# Patient Record
Sex: FEMALE | Race: BLACK OR AFRICAN AMERICAN | NOT HISPANIC OR LATINO | Employment: PART TIME | URBAN - METROPOLITAN AREA
[De-identification: names, ages, dates, MRNs, and addresses within clinical notes are randomized per-mention and may not be internally consistent; named-entity substitution may affect disease eponyms.]

---

## 2021-07-14 LAB
HEPATITIS B SURFACE ANTIGEN (EXTERNAL): NEGATIVE
HIV1+2 AB SERPL QL IA: NEGATIVE
RUBELLA ANTIBODY IGG (EXTERNAL): NORMAL

## 2021-12-17 LAB — GROUP B STREPTOCOCCUS (EXTERNAL): NEGATIVE

## 2022-01-16 ENCOUNTER — HOSPITAL ENCOUNTER (OUTPATIENT)
Facility: CLINIC | Age: 32
Discharge: HOME | End: 2022-01-16
Attending: OBSTETRICS & GYNECOLOGY | Admitting: OBSTETRICS & GYNECOLOGY
Payer: COMMERCIAL

## 2022-01-16 VITALS — TEMPERATURE: 98.6 F | HEIGHT: 62 IN | WEIGHT: 185 LBS | RESPIRATION RATE: 16 BRPM | BODY MASS INDEX: 34.04 KG/M2

## 2022-01-16 PROBLEM — Z36.89 ENCOUNTER FOR TRIAGE IN PREGNANT PATIENT: Status: ACTIVE | Noted: 2022-01-16

## 2022-01-16 LAB
ALBUMIN UR-MCNC: 100 MG/DL
APPEARANCE UR: ABNORMAL
BACTERIA #/AREA URNS HPF: ABNORMAL /HPF
BASOPHILS # BLD AUTO: 0 10E3/UL (ref 0–0.2)
BASOPHILS NFR BLD AUTO: 1 %
BILIRUB UR QL STRIP: NEGATIVE
COLOR UR AUTO: YELLOW
EOSINOPHIL # BLD AUTO: 0 10E3/UL (ref 0–0.7)
EOSINOPHIL NFR BLD AUTO: 1 %
ERYTHROCYTE [DISTWIDTH] IN BLOOD BY AUTOMATED COUNT: 15.4 % (ref 10–15)
GLUCOSE UR STRIP-MCNC: NEGATIVE MG/DL
HCT VFR BLD AUTO: 34.1 % (ref 35–47)
HGB BLD-MCNC: 10.9 G/DL (ref 11.7–15.7)
HGB UR QL STRIP: NEGATIVE
HOLD SPECIMEN: NORMAL
IMM GRANULOCYTES # BLD: 0 10E3/UL
IMM GRANULOCYTES NFR BLD: 1 %
KETONES UR STRIP-MCNC: >150 MG/DL
LEUKOCYTE ESTERASE UR QL STRIP: ABNORMAL
LYMPHOCYTES # BLD AUTO: 0.4 10E3/UL (ref 0.8–5.3)
LYMPHOCYTES NFR BLD AUTO: 6 %
MCH RBC QN AUTO: 27.7 PG (ref 26.5–33)
MCHC RBC AUTO-ENTMCNC: 32 G/DL (ref 31.5–36.5)
MCV RBC AUTO: 87 FL (ref 78–100)
MONOCYTES # BLD AUTO: 0.5 10E3/UL (ref 0–1.3)
MONOCYTES NFR BLD AUTO: 7 %
MUCOUS THREADS #/AREA URNS LPF: PRESENT /LPF
NEUTROPHILS # BLD AUTO: 5.4 10E3/UL (ref 1.6–8.3)
NEUTROPHILS NFR BLD AUTO: 84 %
NITRATE UR QL: NEGATIVE
NRBC # BLD AUTO: 0 10E3/UL
NRBC BLD AUTO-RTO: 0 /100
PH UR STRIP: 6 [PH] (ref 5–7)
PLATELET # BLD AUTO: 73 10E3/UL (ref 150–450)
RBC # BLD AUTO: 3.93 10E6/UL (ref 3.8–5.2)
RBC URINE: 1 /HPF
SP GR UR STRIP: 1.03 (ref 1–1.03)
SQUAMOUS EPITHELIAL: 2 /HPF
UROBILINOGEN UR STRIP-MCNC: NORMAL MG/DL
WBC # BLD AUTO: 6.3 10E3/UL (ref 4–11)
WBC URINE: 4 /HPF

## 2022-01-16 PROCEDURE — 250N000013 HC RX MED GY IP 250 OP 250 PS 637

## 2022-01-16 PROCEDURE — 250N000013 HC RX MED GY IP 250 OP 250 PS 637: Performed by: OBSTETRICS & GYNECOLOGY

## 2022-01-16 PROCEDURE — G0463 HOSPITAL OUTPT CLINIC VISIT: HCPCS

## 2022-01-16 PROCEDURE — 81001 URINALYSIS AUTO W/SCOPE: CPT | Performed by: OBSTETRICS & GYNECOLOGY

## 2022-01-16 PROCEDURE — 85025 COMPLETE CBC W/AUTO DIFF WBC: CPT | Performed by: OBSTETRICS & GYNECOLOGY

## 2022-01-16 RX ORDER — ACETAMINOPHEN 325 MG/1
TABLET ORAL
Status: COMPLETED
Start: 2022-01-16 | End: 2022-01-16

## 2022-01-16 RX ORDER — ACETAMINOPHEN 325 MG/1
975 TABLET ORAL ONCE
Status: COMPLETED | OUTPATIENT
Start: 2022-01-16 | End: 2022-01-16

## 2022-01-16 RX ORDER — DIPHENHYDRAMINE HCL 25 MG
50 CAPSULE ORAL EVERY 6 HOURS PRN
Status: COMPLETED | OUTPATIENT
Start: 2022-01-16 | End: 2022-01-16

## 2022-01-16 RX ORDER — DIPHENHYDRAMINE HYDROCHLORIDE 50 MG/ML
25 INJECTION INTRAMUSCULAR; INTRAVENOUS EVERY 6 HOURS PRN
Status: COMPLETED | OUTPATIENT
Start: 2022-01-16 | End: 2022-01-16

## 2022-01-16 RX ORDER — LIDOCAINE 40 MG/G
CREAM TOPICAL
Status: DISCONTINUED | OUTPATIENT
Start: 2022-01-16 | End: 2022-01-16 | Stop reason: HOSPADM

## 2022-01-16 RX ADMIN — ACETAMINOPHEN 975 MG: 325 TABLET ORAL at 18:58

## 2022-01-16 RX ADMIN — DIPHENHYDRAMINE HYDROCHLORIDE 50 MG: 25 CAPSULE ORAL at 20:20

## 2022-01-16 RX ADMIN — ACETAMINOPHEN 975 MG: 325 TABLET, FILM COATED ORAL at 18:58

## 2022-01-16 ASSESSMENT — MIFFLIN-ST. JEOR: SCORE: 1492.4

## 2022-01-17 NOTE — PROVIDER NOTIFICATION
22 1845   Provider Notification   Provider Name/Title Dr. Ritter   Method of Notification Phone   Request Evaluate - Remote   Notification Reason Patient Arrived     MD updated on patient arrival. She is a  at 39 3/7ths.  Her pregnancy has been uncomplicated.  FHT: 155, moderate variability with accels and variable decels.  She is jv about every 10.5 minutes but is not really feeling them. She came in for severe back pain that started last night and has gotten more intense throughout the day.  She has tried a warm bath, but has not gotten any relief.  The pain is constant and does not come and go.  She was unable to give us a urine sample on arrival.  Orders taken for 50 mg of benadryl and 975 mg of tylenol and a to send a urine sample when the patient is able to give one.  Will update MD as necessary.

## 2022-01-17 NOTE — DISCHARGE INSTRUCTIONS
Discharge Instruction for Undelivered Patients      You were seen for: Pain  We Consulted: Dr. Ritter   You had (Test or Medicine): Vaginal exam, external fetal monitoring, tylenol, benadryl, cbc, UA      Diet:   Drink 8 to 12 glasses of liquids (milk, juice, water) every day.  You may eat meals and snacks.     Activity:  Count fetal kicks everyday (see handout)  Call your doctor or nurse midwife if your baby is moving less than usual.     Call your provider if you notice:  Swelling in your face or increased swelling in your hands or legs.  Headaches that are not relieved by Tylenol (acetaminophen).  Changes in your vision (blurring: seeing spots or stars.)  Nausea (sick to your stomach) and vomiting (throwing up).   Weight gain of 5 pounds or more per week.  Heartburn that doesn't go away.  Signs of bladder infection: pain when you urinate (use the toilet), need to go more often and more urgently.  The bag of huber (rupture of membranes) breaks, or you notice leaking in your underwear.  Bright red blood in your underwear.  Abdominal (lower belly) or stomach pain.  For first baby: Contractions (tightening) less than 5 minutes apart for one hour or more.  Second (plus) baby: Contractions (tightening) less than 10 minutes apart and getting stronger.  *If less than 34 weeks: Contractions (tightening) more than 6 times in one hour.  Increase or change in vaginal discharge (note the color and amount)    Follow-up:  As scheduled in the clinic or sooner

## 2022-01-17 NOTE — PLAN OF CARE
Data: Patient presented to [facility]: 2022  6:14 PM.  Reason for maternal/fetal assessment is back pain. Patient reports severe back pain that started last night and has gotten progressively more intense.  She also says that the pain is constant but does not feel like contractions.  Patient is a .  Prenatal record reviewed. Pregnancy has been uncomplicated..  Gestational Age 39w3d. VSS. Fetal movement active. Patient denies uterine contractions, leaking of vaginal fluid/rupture of membranes, vaginal bleeding, abdominal pain, pelvic pressure, nausea, vomiting, headache, visual disturbances, epigastric or URQ pain, significant edema. Support person is present.   Action: Verbal consent for EFM. Triage assessment completed. Bill of rights reviewed.  Response: Patient verbalized agreement with plan. Will contact Dr Ami Ritter with update and for further orders.

## 2022-01-17 NOTE — PLAN OF CARE
Data: Patient assessed in the [facility] for back and pelvic pain. Cervical exam: see flow sheet for details. Membranes intact. Contractions/uterine assessment; see flow sheet for details.  Action:  Presumed adequate fetal oxygenation documented (see flow record). Discharge instructions reviewed.  Patient instructed to report change in fetal movement, vaginal leaking of fluid or bleeding, abdominal pain, or any concerns related to the pregnancy to her nurse/physician.    Response: Orders to discharge home per Dr. Ritter.  Patient verbalized understanding of education and verbalized agreement with plan. Discharged to home at 2140. Maricruz Bueno RN on 1/16/2022 at 9:56 PM

## 2022-01-17 NOTE — PROVIDER NOTIFICATION
01/16/22 2112   Provider Notification   Provider Name/Title Dr. Ritter    Method of Notification Phone   Request Evaluate - Remote   Notification Reason Status Update     Lab results reviewed.     Platelet Count   Date Value Ref Range Status   01/16/2022 73 (L) 150 - 450 10e3/uL Final     UA RESULTS:  Recent Labs   Lab Test 01/16/22 2023   COLOR Yellow   APPEARANCE Slightly Cloudy*   URINEGLC Negative   URINEBILI Negative   URINEKETONE >150*   SG 1.035   UBLD Negative   URINEPH 6.0   PROTEIN 100 *   NITRITE Negative   LEUKEST Trace*   RBCU 1   WBCU 4     Orders received to discharge home and follow up in clinic for management of plt's. Telephone orders read back and verified. Will inform pt of plan of care. Maricruz Bueno RN on 1/16/2022 at 9:53 PM

## 2022-01-17 NOTE — PROVIDER NOTIFICATION
"   01/16/22 2038   Provider Notification   Provider Name/Title Dr. Ritter    Method of Notification Phone   Request Evaluate - Remote   Notification Reason Status Update     Tylenol and benadryl administered. Pain improving. Pt states the majority of her pain is suprapubic whenever she turns from left to right. UA pending. Pt has a history of thrombocytopenia. She is concerned her plts are very low. CBC-d pending. Pt states she is very uncomfortable and \"can't\" be pregnant any longer. She would like to know how to go about being induced. Induction process discussed in length. She has an appointment with her physician on Wednesday. If UA is negative, orders received to discharge home. Telephone orders read back and verified. Will inform pt of plan of care. Maricruz Bueno RN on 1/16/2022 at 9:03 PM  "

## 2022-01-19 ENCOUNTER — HOSPITAL ENCOUNTER (INPATIENT)
Facility: CLINIC | Age: 32
LOS: 2 days | Discharge: HOME | End: 2022-01-21
Attending: OBSTETRICS & GYNECOLOGY | Admitting: OBSTETRICS & GYNECOLOGY
Payer: COMMERCIAL

## 2022-01-19 DIAGNOSIS — Z11.52 ENCOUNTER FOR PREPROCEDURE SCREENING LABORATORY TESTING FOR COVID-19: Primary | ICD-10-CM

## 2022-01-19 DIAGNOSIS — Z01.812 ENCOUNTER FOR PREPROCEDURE SCREENING LABORATORY TESTING FOR COVID-19: Primary | ICD-10-CM

## 2022-01-19 LAB — SARS-COV-2 RNA RESP QL NAA+PROBE: POSITIVE

## 2022-01-19 PROCEDURE — 87635 SARS-COV-2 COVID-19 AMP PRB: CPT | Performed by: OBSTETRICS & GYNECOLOGY

## 2022-01-19 PROCEDURE — 120N000001 HC R&B MED SURG/OB

## 2022-01-19 PROCEDURE — 250N000013 HC RX MED GY IP 250 OP 250 PS 637: Performed by: OBSTETRICS & GYNECOLOGY

## 2022-01-19 PROCEDURE — 722N000001 HC LABOR CARE VAGINAL DELIVERY SINGLE

## 2022-01-19 PROCEDURE — 250N000011 HC RX IP 250 OP 636: Performed by: OBSTETRICS & GYNECOLOGY

## 2022-01-19 RX ORDER — OXYTOCIN 10 [USP'U]/ML
10 INJECTION, SOLUTION INTRAMUSCULAR; INTRAVENOUS
Status: COMPLETED | OUTPATIENT
Start: 2022-01-19 | End: 2022-01-19

## 2022-01-19 RX ORDER — MISOPROSTOL 200 UG/1
400 TABLET ORAL
Status: DISCONTINUED | OUTPATIENT
Start: 2022-01-19 | End: 2022-01-21 | Stop reason: HOSPADM

## 2022-01-19 RX ORDER — CARBOPROST TROMETHAMINE 250 UG/ML
250 INJECTION, SOLUTION INTRAMUSCULAR
Status: DISCONTINUED | OUTPATIENT
Start: 2022-01-19 | End: 2022-01-21 | Stop reason: HOSPADM

## 2022-01-19 RX ORDER — MODIFIED LANOLIN
OINTMENT (GRAM) TOPICAL
Status: DISCONTINUED | OUTPATIENT
Start: 2022-01-19 | End: 2022-01-21 | Stop reason: HOSPADM

## 2022-01-19 RX ORDER — HYDROCORTISONE 2.5 %
CREAM (GRAM) TOPICAL 3 TIMES DAILY PRN
Status: DISCONTINUED | OUTPATIENT
Start: 2022-01-19 | End: 2022-01-21 | Stop reason: HOSPADM

## 2022-01-19 RX ORDER — OXYCODONE HYDROCHLORIDE 5 MG/1
5 TABLET ORAL EVERY 4 HOURS PRN
Status: DISCONTINUED | OUTPATIENT
Start: 2022-01-19 | End: 2022-01-21 | Stop reason: HOSPADM

## 2022-01-19 RX ORDER — NALOXONE HYDROCHLORIDE 0.4 MG/ML
0.2 INJECTION, SOLUTION INTRAMUSCULAR; INTRAVENOUS; SUBCUTANEOUS
Status: DISCONTINUED | OUTPATIENT
Start: 2022-01-19 | End: 2022-01-21 | Stop reason: HOSPADM

## 2022-01-19 RX ORDER — DOCUSATE SODIUM 100 MG/1
100 CAPSULE, LIQUID FILLED ORAL 2 TIMES DAILY
Status: DISCONTINUED | OUTPATIENT
Start: 2022-01-19 | End: 2022-01-21 | Stop reason: HOSPADM

## 2022-01-19 RX ORDER — ACETAMINOPHEN 325 MG/1
650 TABLET ORAL EVERY 4 HOURS PRN
Status: DISCONTINUED | OUTPATIENT
Start: 2022-01-19 | End: 2022-01-21 | Stop reason: HOSPADM

## 2022-01-19 RX ORDER — HYDROXYZINE HYDROCHLORIDE 25 MG/1
25-50 TABLET, FILM COATED ORAL
Status: DISCONTINUED | OUTPATIENT
Start: 2022-01-19 | End: 2022-01-21 | Stop reason: HOSPADM

## 2022-01-19 RX ORDER — NALOXONE HYDROCHLORIDE 0.4 MG/ML
0.4 INJECTION, SOLUTION INTRAMUSCULAR; INTRAVENOUS; SUBCUTANEOUS
Status: DISCONTINUED | OUTPATIENT
Start: 2022-01-19 | End: 2022-01-21 | Stop reason: HOSPADM

## 2022-01-19 RX ORDER — METHYLERGONOVINE MALEATE 0.2 MG/ML
200 INJECTION INTRAVENOUS
Status: DISCONTINUED | OUTPATIENT
Start: 2022-01-19 | End: 2022-01-21 | Stop reason: HOSPADM

## 2022-01-19 RX ORDER — TRANEXAMIC ACID 10 MG/ML
1 INJECTION, SOLUTION INTRAVENOUS EVERY 30 MIN PRN
Status: DISCONTINUED | OUTPATIENT
Start: 2022-01-19 | End: 2022-01-21 | Stop reason: HOSPADM

## 2022-01-19 RX ORDER — MISOPROSTOL 200 UG/1
800 TABLET ORAL
Status: DISCONTINUED | OUTPATIENT
Start: 2022-01-19 | End: 2022-01-21 | Stop reason: HOSPADM

## 2022-01-19 RX ORDER — IBUPROFEN 800 MG/1
800 TABLET, FILM COATED ORAL EVERY 6 HOURS PRN
Status: DISCONTINUED | OUTPATIENT
Start: 2022-01-19 | End: 2022-01-21 | Stop reason: HOSPADM

## 2022-01-19 RX ORDER — OXYTOCIN/0.9 % SODIUM CHLORIDE 30/500 ML
340 PLASTIC BAG, INJECTION (ML) INTRAVENOUS CONTINUOUS PRN
Status: DISCONTINUED | OUTPATIENT
Start: 2022-01-19 | End: 2022-01-21 | Stop reason: HOSPADM

## 2022-01-19 RX ADMIN — ACETAMINOPHEN 650 MG: 325 TABLET, FILM COATED ORAL at 18:26

## 2022-01-19 RX ADMIN — ACETAMINOPHEN 650 MG: 325 TABLET, FILM COATED ORAL at 22:42

## 2022-01-19 RX ADMIN — OXYTOCIN 10 UNITS: 10 INJECTION, SOLUTION INTRAMUSCULAR; INTRAVENOUS at 17:11

## 2022-01-19 RX ADMIN — IBUPROFEN 800 MG: 800 TABLET, FILM COATED ORAL at 18:26

## 2022-01-19 ASSESSMENT — ACTIVITIES OF DAILY LIVING (ADL)
ADLS_ACUITY_SCORE: 3
DIFFICULTY_EATING/SWALLOWING: NO
CONCENTRATING,_REMEMBERING_OR_MAKING_DECISIONS_DIFFICULTY: NO
WEAR_GLASSES_OR_BLIND: NO
ADLS_ACUITY_SCORE: 12
ADLS_ACUITY_SCORE: 12
WALKING_OR_CLIMBING_STAIRS_DIFFICULTY: NO
ADLS_ACUITY_SCORE: 3
DIFFICULTY_COMMUNICATING: NO
DRESSING/BATHING_DIFFICULTY: NO
PATIENT_/_FAMILY_COMMUNICATION_STYLE: SPOKEN LANGUAGE (ENGLISH OR BILINGUAL)
DOING_ERRANDS_INDEPENDENTLY_DIFFICULTY: NO
ADLS_ACUITY_SCORE: 3
HEARING_DIFFICULTY_OR_DEAF: NO
ADLS_ACUITY_SCORE: 3

## 2022-01-19 ASSESSMENT — MIFFLIN-ST. JEOR: SCORE: 1492.4

## 2022-01-19 NOTE — PROGRESS NOTES
"  2022    Stephanie Nicole  [id number]      OB Admit History & Physical      She has noticed uterine contractions since this morning that have been getting more frequent and more intense. She decided to come in until 5 pm while she was already experiencing intense pain with her contractions happening every 3 min since 3 pm. States she was picked up by her sister and she was trying to control her contractions\". She ended up having a precipitous delivery.     I was called at 1704 hrs, I was here at 1725 hrs. In house OB delivered her placenta.     Upon encounter, pt states she feels exhausted but overall in good spirits.       No LMP recorded. Patient is pregnant.   Her Estimated Date of Delivery: 2022, making her 39w6d.      Estimated body mass index is 33.84 kg/m  as calculated from the following:    Height as of 22: 1.575 m (5' 2\").    Weight as of 22: 83.9 kg (185 lb).  Her prenatal course has been  complicated by   - Thrombocytopenia - will need follow up with hematology   - was on 40 mg Dexamethasone (day 2)  - SHEILA/Pica   - declining iron PO    See prenatal for labs.  neg GBS, Rubella Immune, RH Positive       She is a 32 year old   Her OB history:   OB History    Para Term  AB Living   5 3 3 0 1 3   SAB IAB Ectopic Multiple Live Births   1 0 0 0 3      # Outcome Date GA Lbr Facundo/2nd Weight Sex Delivery Anes PTL Lv   5 Current            4 SAB 2020     SAB      3 Term 19    M    NASH   2 Term 16    F    NASH   1 Term 05/10/14    M    NASH          No past medical history on file.     No past surgical history on file.      No current outpatient medications on file.       Allergies: Patient has no known allergies.      REVIEW OF SYSTEMS:  NEUROLOGIC:  Negative  EYES:  Negative  ENT:  Negative  GI:  Negative  :  Negative  GYN:  Negative  CV:  Negative  PULMONARY:  Negative  MUSCULOSKELETAL:  Negative  PSYCH:  Negative      Social History "     Socioeconomic History     Marital status:      Spouse name: Not on file     Number of children: Not on file     Years of education: Not on file     Highest education level: Not on file   Occupational History     Not on file   Tobacco Use     Smoking status: Never Smoker     Smokeless tobacco: Never Used   Substance and Sexual Activity     Alcohol use: Not Currently     Drug use: Never     Sexual activity: Not on file   Other Topics Concern     Not on file   Social History Narrative     Not on file     Social Determinants of Health     Financial Resource Strain: Not on file   Food Insecurity: Not on file   Transportation Needs: Not on file   Physical Activity: Not on file   Stress: Not on file   Social Connections: Not on file   Intimate Partner Violence: Not on file   Housing Stability: Not on file      No family history on file.      Exam:    Vitals:   /62       Alert Awake in NAD  HEENT grossly normal  Neck: no lymphadenopathy or thryoidomegaly  Lungs CTAB  Back No CVAT  Heart RR  ABD gravid, NABS, soft, NT on exam with NT fundus palpable  Cervix NA  EXT:  no edema, no calf tenderness    For delivery note. Please see Dr. Davis's note and RN note.       Assessment/Plan: Stephanie Nicole is a 32 year old  at 39w6d GA by LMP here s/p precipitous delivery.     ANTEPARTUM  Prenatal Care:  - OB labs reviewed: A positive, Rubella immune, Heb B immune  - Genetics: declines  - Anatomy ultrasound: isolated EIF, otherwise normal anatomy.  Declines further workup.  - Rh positive, Rhogam not indicated  - GCT normal  - Declines flu, s/p Tdap   - COVID vaccine declines  - GBS negative  - Contraception: IUD, unsure which one, info given   - Feed: Breast   - Continue PNV     Others  Thrombocytopenia  - ok to discontinue dexamethasone  - will need follow up with hematology (PN)    SHEILA  - encourage to resume PO iron    S/p preciptious delivery  - continue routine postpartum care      Ami Ritter MD  Dept of  OB/GYN  January 19, 2022

## 2022-01-19 NOTE — PROVIDER NOTIFICATION
"   01/19/22 1704   Provider Notification   Provider Name/Title Dr. Ritter   Method of Notification Phone   Request Attend Delivery;Evaluate in Person   Notification Reason Patient Arrived;SVE  (pt complete and ready to push, multip)      Updated of pt arrival and complete stating \"I need to push.\"  Stated \"I will be there in 20 mins.\"  "

## 2022-01-19 NOTE — PROGRESS NOTES
Called emergently to L&D as the in house MD    Patient arrived complete and pushing and was delivered by RN 3 mins after arrival on L&D.  Attending MD was notified and on way to hospital.    Baby in Mother's arms and stable upon my entry into room.    Placenta delivered with mild traction, intact with 3 vessel cord.  Perineum inspected and intact.  No lacerations.  Minimal bleeding.  10 units pitocin given IM.    Dr. Ritter arrived shortly thereafter and assumed cares    Antonio Davis MD

## 2022-01-20 LAB
ERYTHROCYTE [DISTWIDTH] IN BLOOD BY AUTOMATED COUNT: 15.3 % (ref 10–15)
HCT VFR BLD AUTO: 33.3 % (ref 35–47)
HGB BLD-MCNC: 10.3 G/DL (ref 11.7–15.7)
MCH RBC QN AUTO: 27.3 PG (ref 26.5–33)
MCHC RBC AUTO-ENTMCNC: 30.9 G/DL (ref 31.5–36.5)
MCV RBC AUTO: 88 FL (ref 78–100)
PLAT MORPH BLD: NORMAL
PLATELET # BLD AUTO: 85 10E3/UL (ref 150–450)
RBC # BLD AUTO: 3.77 10E6/UL (ref 3.8–5.2)
RBC MORPH BLD: NORMAL
WBC # BLD AUTO: 7.6 10E3/UL (ref 4–11)

## 2022-01-20 PROCEDURE — 250N000011 HC RX IP 250 OP 636: Performed by: OBSTETRICS & GYNECOLOGY

## 2022-01-20 PROCEDURE — 36415 COLL VENOUS BLD VENIPUNCTURE: CPT | Performed by: OBSTETRICS & GYNECOLOGY

## 2022-01-20 PROCEDURE — 85027 COMPLETE CBC AUTOMATED: CPT | Performed by: OBSTETRICS & GYNECOLOGY

## 2022-01-20 PROCEDURE — 120N000001 HC R&B MED SURG/OB

## 2022-01-20 PROCEDURE — 250N000013 HC RX MED GY IP 250 OP 250 PS 637: Performed by: OBSTETRICS & GYNECOLOGY

## 2022-01-20 RX ORDER — IBUPROFEN 800 MG/1
800 TABLET, FILM COATED ORAL EVERY 6 HOURS PRN
Qty: 60 TABLET | Refills: 1 | Status: SHIPPED | OUTPATIENT
Start: 2022-01-20

## 2022-01-20 RX ORDER — DEXAMETHASONE 4 MG/1
40 TABLET ORAL DAILY
Status: COMPLETED | OUTPATIENT
Start: 2022-01-20 | End: 2022-01-21

## 2022-01-20 RX ORDER — DOCUSATE SODIUM 100 MG/1
100 CAPSULE, LIQUID FILLED ORAL 2 TIMES DAILY PRN
Qty: 10 CAPSULE | Refills: 1 | Status: SHIPPED | OUTPATIENT
Start: 2022-01-20

## 2022-01-20 RX ORDER — DIPHENHYDRAMINE HCL 25 MG
25-50 CAPSULE ORAL EVERY 6 HOURS PRN
Status: DISCONTINUED | OUTPATIENT
Start: 2022-01-20 | End: 2022-01-21 | Stop reason: HOSPADM

## 2022-01-20 RX ADMIN — ACETAMINOPHEN 650 MG: 325 TABLET, FILM COATED ORAL at 04:10

## 2022-01-20 RX ADMIN — ACETAMINOPHEN 650 MG: 325 TABLET, FILM COATED ORAL at 13:59

## 2022-01-20 RX ADMIN — IBUPROFEN 800 MG: 800 TABLET, FILM COATED ORAL at 00:44

## 2022-01-20 RX ADMIN — ACETAMINOPHEN 650 MG: 325 TABLET, FILM COATED ORAL at 18:17

## 2022-01-20 RX ADMIN — IBUPROFEN 800 MG: 800 TABLET, FILM COATED ORAL at 06:42

## 2022-01-20 RX ADMIN — IBUPROFEN 800 MG: 800 TABLET, FILM COATED ORAL at 13:59

## 2022-01-20 RX ADMIN — IBUPROFEN 800 MG: 800 TABLET, FILM COATED ORAL at 21:34

## 2022-01-20 RX ADMIN — ACETAMINOPHEN 650 MG: 325 TABLET, FILM COATED ORAL at 22:42

## 2022-01-20 RX ADMIN — DIPHENHYDRAMINE HYDROCHLORIDE 25 MG: 25 CAPSULE ORAL at 21:34

## 2022-01-20 RX ADMIN — ACETAMINOPHEN 650 MG: 325 TABLET, FILM COATED ORAL at 08:28

## 2022-01-20 RX ADMIN — DEXAMETHASONE 40 MG: 4 TABLET ORAL at 09:40

## 2022-01-20 RX ADMIN — DIPHENHYDRAMINE HYDROCHLORIDE 25 MG: 25 CAPSULE ORAL at 09:48

## 2022-01-20 ASSESSMENT — ACTIVITIES OF DAILY LIVING (ADL)
ADLS_ACUITY_SCORE: 3

## 2022-01-20 NOTE — PLAN OF CARE
Data: Stephanie Nicole transferred to 454 via wheelchair at 0010. Baby transferred via parent's arms.  Action: Receiving unit notified of transfer: Yes. Patient and family notified of room change. Report given to MARGAUX Acosta at 0022. Belongings sent to receiving unit. Accompanied by Registered Nurse. Oriented patient to surroundings. Call light within reach. ID bands double-checked with receiving RN.  Response: Patient tolerated transfer and is stable.

## 2022-01-20 NOTE — PROVIDER NOTIFICATION
Notified Dr. Cardenas of patient's request for prn Benadryl, and seeking clarification regarding the steroid she was taking prior to delivery. Dr. Cardenas placed orders - see MAR. Will update patient's primary RN.

## 2022-01-20 NOTE — LACTATION NOTE
"This note was copied from a baby's chart.  Lactation visit. This is Stephanie's fourth child, she had recently finished breastfeeding when writer arrived. Stephanie has elected to supplement with formula post nursing per her preference - this is her plan at discharge as well. She states she nursed her previous children \"a little bit\" and hopes to breastfeed longer this time. Writer reviewed importance of placing infant to breast first to bring in milk supply, followed by supplementation. Reviewed amounts to offer, pace feeding of formula to protect breastfeeding. Stephanie is aware she may call prn for lactation assistance.   "

## 2022-01-20 NOTE — DISCHARGE SUMMARY
[facility] Discharge Summary    Stephanie Nicole MRN# [id number]   Age: 32 year old YOB: 1990     Date of Admission:  2022  Date of Discharge::  2022   Admitting Physician:  Ami Ritter MD  Discharge Physician:  Dr. Claudine Ritter            Admission Diagnoses:     IUP at 39w6d    Active labor, complete dilation upon presentation    SHEILA    H/o Thrombocytopenia, suspect ITP this pregnancy          Discharge Diagnosis:       IUP at 39w6d, now delivered    COVID positive status           Procedures:     Procedure(s):             Medications Prior to Admission:     No medications prior to admission.             Discharge Medications:        Review of your medicines      START taking      Dose / Directions   acetaminophen 325 MG tablet  Commonly known as: TYLENOL      Dose: 650 mg  Take 2 tablets (650 mg) by mouth every 4 hours as needed for mild pain or fever  Quantity: 60 tablet  Refills: 0     docusate sodium 100 MG capsule  Commonly known as: COLACE      Dose: 100 mg  Take 1 capsule (100 mg) by mouth 2 times daily as needed for constipation  Quantity: 10 capsule  Refills: 1     ibuprofen 800 MG tablet  Commonly known as: ADVIL/MOTRIN      Dose: 800 mg  Take 1 tablet (800 mg) by mouth every 6 hours as needed for other (cramping)  Quantity: 60 tablet  Refills: 1           Where to get your medicines      These medications were sent to [facility] [address] [address]    Phone: [phone]     acetaminophen 325 MG tablet    docusate sodium 100 MG capsule    ibuprofen 800 MG tablet                Consultations:   None          Brief Admission History and Intrapartum Course:   She has noticed uterine contractions since this morning that have been getting more frequent and more intense. She decided to come in until 5 pm while she was already experiencing intense pain with her contractions happening  "every 3 min since 3 pm. States she was picked up by her sister and she was trying to control her contractions\". She ended up having a precipitous delivery with in-house services.     Delivery Summary    Stephanie Nicole MRN# [id number]   Age: 32 year old YOB: 1990          Bonnie, Female-Stephanie [[id number]]    Labor Event Times    Labor onset date: 22 Onset time:  1:30 PM   Dilation complete date: 22 Complete time:  5:03 PM   Start pushing date/time: 2022 1703      Labor Length    1st Stage (hrs): 3 (min): 33   2nd Stage (hrs): 0 (min): 1   3rd Stage (hrs): 0 (min): 6      Labor Events     labor?: No   steroids: None  Labor Type: Spontaneous  Predominate monitoring during 1st stage: continuous electronic fetal monitoring     Antibiotics received during labor?: No     Rupture date/time: 22   Rupture type: Spontaneous rupture of membranes occuring during spontaneous labor or augmentation  Fluid color: Clear  Fluid odor: Normal     1:1 continuous labor support provided by?: RN       Delivery/Placenta Date and Time    Delivery Date: 22 Delivery Time:  5:04 PM   Placenta Date/Time: 2022  5:10 PM  Oxytocin given at the time of delivery: after delivery of baby          Vaginal Counts          Needles Suture Needles Sponges (RETIRED) Instruments   Initial counts       Added to count       Relief counts       Final counts             Placed during labor Accounted for at the end of labor   FSE No NA   IUPC No NA   Cervadil No NA                     Apgars    Living status: Living   1 Minute 5 Minute 10 Minute 15 Minute 20 Minute   Skin color: 0  1       Heart rate: 2  2       Reflex irritability: 2  2       Muscle tone: 2  2       Respiratory effort: 2  2       Total: 8  9       Apgars assigned by: ARNAV WERNER RN     Cord    Vessels: 3 Vessels    Cord Complications: Nuchal   Nuchal Intervention: delivered through         Nuchal cord description: loose nuchal cord " "        Cord Blood Disposition: Lab    Gases Sent?: No    Delayed cord clamping?: Yes    Cord Clamping Delay (seconds):  seconds    Stem cell collection?: No        Resuscitation    Methods: None     [place] Measurements    Weight: 6 lb 9.8 oz Length: 1' 8.25\"   Head circumference: 13 cm       Skin to Skin and Feeding Plan    Skin to skin initiation date/time: 1841    Skin to skin with: Mother  Skin to skin end date/time:     Breastfeeding initiated date/time: 2022 1740     Labor Events and Shoulder Dystocia    Fetal Tracing Prior to Delivery: Category 1  Shoulder dystocia present?: Neg     Delivery (Maternal) (Provider to Complete) (090374)    Episiotomy: None  Perineal lacerations: None    Repair suture: None     Blood Loss  Mother: Stephanie Nicole #[id number]   Start of Mother's Information    Delivery Blood Loss  22 1330 - 22 1704    Postpartum QBL (mL) Hospital Encounter 227 mL    Total  227 mL         End of Mother's Information  Mother: Stephanie Nicole #[id number]          Delivery - Provider to Complete (516013)    Attempted Delivery Types (Choose all that apply): Spontaneous Vaginal Delivery  Delivery Type (Choose the 1 that will go to the Birth History): Vaginal, Spontaneous                                 Placenta    Date/Time: 2022  5:10 PM  Removal: Spontaneous  Disposition: Hospital disposal           Anesthesia    Method: None                Presentation and Position    Presentation: Vertex    Position: Left Occiput Anterior                        Post-partum Course:   The patient's hospital course was unremarkable.  On discharge, her pain was well controlled. Vaginal bleeding is similar to peak menstrual flow.  Voiding without difficulty.  Ambulating well and tolerating a normal diet.  No fever.  Breastfeeding well with formula supplementation.  Infant is stable.  She was discharged on post-partum day #2    Lab Results   Component Value Date    HGB 10.3 2022 "             Discharge Instructions and Follow-Up:     Discharge diet: Regular   Discharge activity: Pelvic rest for 6 weeks including no sexual intercourse, tampons, or douching.    Discharge follow-up: Follow up with your primary OB for a routine postpartum visit in 6 weeks and with hematology regarding ITP vs. Other at 6-8 weeks PP.            Discharge Disposition:     Discharged to home      Dr. Claudine Ritter  [phone]

## 2022-01-20 NOTE — PROGRESS NOTES
"[facility]   Post-partum Note    Name:  Stephanie Nicole  MRN: [id number]    S: Patient states she is doing well overall.  Pain is controlled, but she is due for more tylenol at 0810 which she would like.  Tolerating regular diet without nausea or vomiting. Voiding spontaneously, passing flatus and had a BM.   Ambulating without dizziness.  Lochia less than yesterday, did have a blood clot when she first urinated this Am.  Breast feeding with formula supplementation - she states she needs more formula.  Asking when her infant can get a bath to get the stuff out of her hair.  Plans discharge tomorrow AM.    O:   Patient Vitals for the past 24 hrs:   BP Temp Temp src Pulse Resp Height Weight   22 0410 100/52 -- -- 78 16 -- --   22 2245 91/52 -- -- -- 16 1.575 m (5' 2\") 83.9 kg (185 lb)   224 -- 98.5  F (36.9  C) Oral -- -- -- --   22 1906 103/56 -- -- -- -- -- --   22 1851 110/61 -- -- -- -- -- --   22 1836 109/66 -- -- -- -- -- --   22 1821 111/61 -- -- -- -- -- --   22 1806 108/63 -- -- -- -- -- --   22 1751 109/62 -- -- -- -- -- --   22 1737 113/62 -- -- -- -- -- --     Gen:  Resting comfortably, NAD  CV:  Regular rate  Pulm:  Non-labored breathing.  No cough or wheezing.   Abd:  Soft    Labs:   Component      Latest Ref Rng & Units 2022   WBC      4.0 - 11.0 10e3/uL 7.6   RBC Count      3.80 - 5.20 10e6/uL 3.77 (L)   Hemoglobin      11.7 - 15.7 g/dL 10.3 (L)   Hematocrit      35.0 - 47.0 % 33.3 (L)   MCV      78 - 100 fL 88   MCH      26.5 - 33.0 pg 27.3   MCHC      31.5 - 36.5 g/dL 30.9 (L)   RDW      10.0 - 15.0 % 15.3 (H)   Platelet Count      150 - 450 10e3/uL 85 (L)   Platelet Morphology      Automated Count Confirmed. Platelet morphology is normal. Automated Count Confirmed. Platelet morphology is normal.   RBC Morphology       Confirmed RBC Indices       Assessment/Plan:  32 year old  on PPD #1 s/p precipitous .  " Continue with routine postpartum management.     Pain: Well-controlled with ibuprofen and tylenol  Hgb:   - Known SHEILA: 10.9 on 1/16>unknown EBL>10.3. VSS as noted above, asymptomatic.   - Gestational thrombocytopenia vs ITP  -  Plt 112K>73K on 1/16/22>85  - S/p e-consult w/ Hematology completed 1/18, started dexamethasone 40mg PO x4 days expect platelets will rise to peak in about 8 days. Continue today and tomorrow.   GI:  BID Senna/Colace.  PRN Simethicone.   PPx:  Encouraged ambulation   Rh: Positive  Rubella: Immune  Feed: Breast  BC: IUD  Dispo: Plan for home on PPD#2.     Marcela Cardenas MD   Pager: [phone]   January 20, 2022

## 2022-01-20 NOTE — PLAN OF CARE
Patient stable this afternoon. Resting most of the afternoon. Mostly independent with self and  cares. Ibuprofen and Tylenol given for cramping with breast feeding.

## 2022-01-20 NOTE — PROVIDER NOTIFICATION
"   22 1701   Vaginal Exam   Method sterile exam per RN   Vaginal Bleeding bloody show   Cervical Position anterior   Cervical Dilation (cm) other (see comments)   Cervical Effacement 100%   Fetal Presentation cephalic   Fetal Station 2     1700: Patient arrived on unit stating \"I have the urge to push right now!\" Patient's sister states labor began late this morning, with strong, painful ctx beginning at 1330. Patient moved to 412.  1701: EFM applied, FHR reassuring with moderate variability. SVE per Lucero Armendariz, RN: 10/100/+2, membranes intact. IHOB Dr. Davis, Dr. Ritter, NICU paged  1702: SROM with clear fluid, fetal head   1704: Delivery of baby girl through loose nuchal cord. Baby placed skin to skin with mother, APGARs 8/9  1710: Dr. Davis at bedside  1711: Placenta delivered, IM pit 10mg administered, MD assuming cares  "

## 2022-01-20 NOTE — PLAN OF CARE
Patient vital signs stable and meeting expected outcomes.  Attempting to breastfeed every 2-3 hours, per pt request also supplementing with formula.   Up independently and voiding adequately.  Using tylenol and ibuprofen for pain.  Able to perform all cares for self and infant.  Bonding well with baby.   Will continue to monitor.

## 2022-01-20 NOTE — PLAN OF CARE
Data: Vital signs within normal limits. Postpartum checks within normal limits - see flow record. Patient eating and drinking normally. Patient able to empty bladder independently and is up ambulating. No apparent signs of infection. Patient performing self cares and is able to care for infant.  Action: Patient medicated during the shift for pain. See MAR. Patient reassessed within 1 hour after each medication and pain was improved - patient stated she was comfortable. Patient started on Decadron this shift (see MAR). Patient education done about breast/bottle feeding. See flow record.  Response: Positive attachment behaviors observed with infant. Support persons not present.   Plan: Anticipate discharge on 1/21.

## 2022-01-21 VITALS
BODY MASS INDEX: 34.04 KG/M2 | DIASTOLIC BLOOD PRESSURE: 52 MMHG | WEIGHT: 185 LBS | HEIGHT: 62 IN | TEMPERATURE: 97.4 F | HEART RATE: 71 BPM | SYSTOLIC BLOOD PRESSURE: 95 MMHG | RESPIRATION RATE: 18 BRPM

## 2022-01-21 PROCEDURE — 250N000011 HC RX IP 250 OP 636: Performed by: OBSTETRICS & GYNECOLOGY

## 2022-01-21 PROCEDURE — 250N000013 HC RX MED GY IP 250 OP 250 PS 637: Performed by: OBSTETRICS & GYNECOLOGY

## 2022-01-21 RX ORDER — ACETAMINOPHEN 325 MG/1
650 TABLET ORAL EVERY 4 HOURS PRN
Qty: 60 TABLET | Refills: 0 | Status: SHIPPED | OUTPATIENT
Start: 2022-01-21

## 2022-01-21 RX ADMIN — ACETAMINOPHEN 650 MG: 325 TABLET, FILM COATED ORAL at 08:41

## 2022-01-21 RX ADMIN — IBUPROFEN 800 MG: 800 TABLET, FILM COATED ORAL at 10:37

## 2022-01-21 RX ADMIN — IBUPROFEN 800 MG: 800 TABLET, FILM COATED ORAL at 03:14

## 2022-01-21 RX ADMIN — ACETAMINOPHEN 650 MG: 325 TABLET, FILM COATED ORAL at 03:13

## 2022-01-21 RX ADMIN — DEXAMETHASONE 40 MG: 4 TABLET ORAL at 10:37

## 2022-01-21 ASSESSMENT — ACTIVITIES OF DAILY LIVING (ADL)
ADLS_ACUITY_SCORE: 3

## 2022-01-21 NOTE — PLAN OF CARE
Vital signs stable on room air. Bonding well with infant. Voiding. Breastfeeding well with minimal assistance from staff. Pain adequately controled with tylenol.

## 2022-01-21 NOTE — PLAN OF CARE
Vital signs stable. Postpartum assessment WDL. Pain well controlled. Up ad/josette. Voiding w/o difficulty. Breastfeeding and bottle feeding . Bonding well with . Pt received complete discharge paperwork and home medications. Discharge outcomes on care plan met. Pt states understanding and comfort with self care and follow up care. Pt stated she did not need a breastpump. Pt had no further questions at the time of discharge and no unmet needs were identified. ID bands double checked and verified with parents and . Electronic security band removed from . Pt discharged @1200.

## 2022-01-21 NOTE — PLAN OF CARE
Assumed care at 1900: Patient stable and independent with self cares. Breastfeeding and tolerating well, bottle feeding formula as well per parental request. Tylenol and Ibuprofen given for pain control. Bonding well with infant, attentive to cares. Continue with plan of care.

## 2022-01-21 NOTE — PLAN OF CARE
VSS. Up independent in room, voiding without difficultly. Pain managed with ibuprofen and tylenol. Breastfeeding well and supplementing with formula.. Bonding well with infant.

## 2022-01-21 NOTE — PROGRESS NOTES
[facility]   Post-partum Note    Name:  Stephanie Nicole  MRN: [id number]    S: Patient states she is doing well overall.  Pain is controlled. Tolerating regular diet without nausea or vomiting. Voiding spontaneously, passing flatus and had a BM.   Ambulating without dizziness.  Lochia is trending down.  Breast feeding with formula supplementation. States she is ready to go home today.     O:   Patient Vitals for the past 24 hrs:   BP Temp Temp src Pulse Resp   22 0841 95/52 97.4  F (36.3  C) Oral 71 18   22 2325 106/51 98.1  F (36.7  C) Oral 81 16     Gen:  Resting comfortably, NAD  CV:  Regular rate  Pulm:  Non-labored breathing.  No cough or wheezing.   Abd:  Soft    Labs:   Lab Results   Component Value Date    HGB 10.3 2022       Assessment/Plan:  32 year old  on PPD #2 s/p precipitous .  Continue with routine postpartum management.     Pain: Well-controlled with ibuprofen and tylenol  Hgb:   - Known SHEILA: 10.9 on >unknown EBL>10.3. VSS as noted above, asymptomatic.    - continue prenatal vitamins, iron rich diet and Vit C  - Gestational thrombocytopenia vs ITP  -  Plt 112K>73K on 22>85  - S/p e-consult w/ Hematology completed , started dexamethasone 40mg PO x4 days expect platelets will rise to peak in about 8 days. Last dose today.   GI:  BID Senna/Colace.  PRN Simethicone.   PPx:  Encouraged ambulation   Rh: Positive  Rubella: Immune  Feed: Breast  BC: IUD  Dispo: Discharge home today    Dr. Claudine Ritter  [phone]